# Patient Record
Sex: FEMALE | Race: BLACK OR AFRICAN AMERICAN | NOT HISPANIC OR LATINO | Employment: UNEMPLOYED
[De-identification: names, ages, dates, MRNs, and addresses within clinical notes are randomized per-mention and may not be internally consistent; named-entity substitution may affect disease eponyms.]

---

## 2017-08-26 ENCOUNTER — HEALTH MAINTENANCE LETTER (OUTPATIENT)
Age: 22
End: 2017-08-26

## 2018-08-01 ENCOUNTER — OFFICE VISIT (OUTPATIENT)
Dept: FAMILY MEDICINE | Facility: CLINIC | Age: 23
End: 2018-08-01
Payer: COMMERCIAL

## 2018-08-01 VITALS
HEART RATE: 76 BPM | BODY MASS INDEX: 27.99 KG/M2 | DIASTOLIC BLOOD PRESSURE: 80 MMHG | WEIGHT: 173.4 LBS | TEMPERATURE: 98.3 F | OXYGEN SATURATION: 98 % | SYSTOLIC BLOOD PRESSURE: 110 MMHG

## 2018-08-01 DIAGNOSIS — M79.672 BILATERAL FOOT PAIN: Primary | ICD-10-CM

## 2018-08-01 DIAGNOSIS — Z00.00 HEALTHCARE MAINTENANCE: ICD-10-CM

## 2018-08-01 DIAGNOSIS — M79.671 BILATERAL FOOT PAIN: Primary | ICD-10-CM

## 2018-08-01 RX ORDER — CALCIUM CARBONATE 500(1250)
1 TABLET ORAL 2 TIMES DAILY
Qty: 180 TABLET | Refills: 3 | Status: SHIPPED | OUTPATIENT
Start: 2018-08-01

## 2018-08-01 ASSESSMENT — ENCOUNTER SYMPTOMS
CARDIOVASCULAR NEGATIVE: 1
CONSTITUTIONAL NEGATIVE: 1
RESPIRATORY NEGATIVE: 1

## 2018-08-01 NOTE — PATIENT INSTRUCTIONS
Here is the plan from today's visit    1. Healthcare maintenance  - cholecalciferol (VITAMIN D3) 1000 UNIT tablet; Take 1 tablet (1,000 Units) by mouth daily  Dispense: 100 tablet; Refill: 3  - calcium carbonate (OS-DALE 500 MG [place]. [place]) 500 MG tablet; Take 1 tablet (500 mg) by mouth 2 times daily  Dispense: 180 tablet; Refill: 3    2. Bilateral foot pain      Superfeet - green color - insole to support feet, prevent pain.        Thank you for coming to [facility] today.  Lab Testing:  **If you had lab testing today and your results are reassuring or normal they will be mailed to you or sent through [employer] within 7 days.   **If the lab tests need quick action we will call you with the results.  The phone number we will call with results is # [phone] (home) . If this is not the best number please call our clinic and change the number.  Medication Refills:  If you need any refills please call your pharmacy and they will contact us.   If you need to  your refill at a new pharmacy, please contact the new pharmacy directly. The new pharmacy will help you get your medications transferred faster.   Scheduling:  If you have any concerns about today's visit or wish to schedule another appointment please call our office during normal business hours [phone] (8-5:00 M-F)  If a referral was made to a [facility] and you don't get a call from central scheduling please call [phone].  If a Mammogram was ordered for you at [facility] call [phone] to schedule or change your appointment.  If you had an XRay/CT/Ultrasound/MRI ordered the number is [phone] to schedule or change your radiology appointment.   Medical Concerns:  If you have urgent medical concerns please call [phone] at any time of the day.  If you have a medical emergency please call [phone].

## 2018-08-01 NOTE — PROGRESS NOTES
"    Female Physical Note          HPI         Concerns today: {SMI CONCERNS:382374}  {:523373}    Patient Active Problem List   Diagnosis     Health Care Home     Functional Murmur     Dysmenorrhea     Vitamin D deficiency     Myopia of both eyes       Past Medical History:   Diagnosis Date     Still's heart murmur 2007       Previous Medical Care   ***     History reviewed. No pertinent family history.           Review of Systems:     Review of Systems:  {ROS NORMAL:429122::\"CONSTITUTIONAL: NEGATIVE for fever, chills, change in weight\",\"INTEGUMENTARY/SKIN: NEGATIVE for worrisome rashes, moles or lesions\",\"EYES: NEGATIVE for vision changes or irritation\",\"ENT/MOUTH: NEGATIVE for ear, mouth and throat problems\",\"RESP: NEGATIVE for significant cough or SOB\",\"BREAST: NEGATIVE for masses, tenderness or discharge\",\"CV: NEGATIVE for chest pain, palpitations or peripheral edema\",\"GI: NEGATIVE for nausea, abdominal pain, heartburn, or change in bowel habits\",\": NEGATIVE for frequency, dysuria, or hematuria\",\"MUSCULOSKELETAL: NEGATIVE for significant arthralgias or myalgia\",\"NEURO: NEGATIVE for weakness, dizziness or paresthesias\",\"ENDOCRINE: NEGATIVE for temperature intolerance, skin/hair changes\",\"HEME/ALLERGY: NEGATIVE for bleeding problems\",\"PSYCHIATRIC: NEGATIVE for changes in mood or affect\"}  Sleep:   Do you snore most or the night (as reported by a family member)? {NO IS DEFAULT/YES:91190239::\"No\"}  Do you feel sleepy or extremely tired during most of the day? {NO IS DEFAULT/YES:27432561::\"No\"}  {If both questions are answered with \"yes\" consider evaluating the patient for WILLIE with the dot phrase \".smics\" either this visit or at a follow up visit }           Social History     Social History     Social History     Marital status: Single     Spouse name: N/A     Number of children: N/A     Years of education: N/A     Occupational History     Not on file.     Social History Main Topics     Smoking status: " "Never Smoker     Smokeless tobacco: Never Used     Alcohol use No     Drug use: No     Sexual activity: No     Other Topics Concern     Not on file     Social History Narrative       Marital Status:{MARITAL STATUS:985624}  Who lives in your household? ***    Has anyone hurt you physically, for example by pushing, hitting, slapping or kicking you or forcing you to have sex? {[facility] DENIES:338042::\"Denies\"}  Do you feel threatened or controlled by a partner, ex-partner or anyone in your life? {[facility] DENIES:327151::\"Denies\"}    Sexual Health     Sexual concerns: {YES / NO:328200::\"Yes\"}   STI History: {NEG/POS-NEG DEFAULT:471853::\"Neg\"}  Pregnancy History:   LMP No LMP recorded. {[facility] LMP:842875}  Last Pap Smear Date: No results found for: PAP  Abnormal Pap History: {[facility] ABNORMAL PAP:312036}    Recommended Screening     {[facility] USPSTF LEVEL A:344912}  {[facility] USPSTF LEVEL B:016760}  {B-RST BRCA- Risk Tool}         Physical Exam:     Vitals: /80  Pulse 76  Temp 98.3  F (36.8  C) (Oral)  Wt 173 lb 6.4 oz (78.7 kg)  SpO2 98%  BMI 27.99 kg/m2  BMI= Body mass index is 27.99 kg/(m^2).   {EXAM - FEMALE- zebra stripe:063498::\"GENERAL: healthy, alert and no distress\",\"EYES: Eyes grossly normal to inspection, extraocular movements - intact, and PERRL\",\"HENT: ear canals- normal; TMs- normal; Nose- normal; Mouth- no ulcers, no lesions\",\"NECK: no tenderness, no adenopathy, no asymmetry, no masses, no stiffness; thyroid- normal to palpation\",\"RESP: lungs clear to auscultation - no rales, no rhonchi, no wheezes\",\"BREAST: no masses, no tenderness, no nipple discharge, no palpable axillary masses or adenopathy\",\"CV: regular rates and rhythm, normal S1 S2, no S3 or S4 and no murmur, no click or rub -\",\"ABDOMEN: soft, no tenderness, no  hepatosplenomegaly, no masses, normal bowel sounds\",\"MS: extremities- no gross deformities noted, no edema\",\"SKIN: no suspicious lesions, no rashes\",\"NEURO: strength and tone- normal, sensory exam- " "grossly normal, mentation- intact, speech- normal, reflexes- symmetric\",\"BACK: no CVA tenderness, no paralumbar tenderness\",\"- female: cervix- normal, adnexae- normal; uterus- normal, no masses, no discharge\",\"RECTAL- female: no masses, no hemorrhoids\",\"PSYCH: Alert and oriented times 3; speech- coherent , normal rate and volume; able to articulate logical thoughts, able to abstract reason, no tangential thoughts, no hallucinations or delusions, affect- normal\",\"LYMPHATICS: ant. cervical- normal, post. cervical- normal, axillary- normal, supraclavicular- normal, inguinal- normal\"}      Assessment and Plan      Danica was seen today for physical and refill request.    Diagnoses and all orders for this visit:    Routine general medical examination at a health care facility      {[facility] FEMALE ASSESSMENT:870116::\"1. Health Care Maintenance: Normal Physical Exam\"}      1. {[facility] FEMALE PLAN 1:960700}  2.Contraception: {[facility] CONTRACEPTION:720654}    Options for treatment and follow-up care were reviewed with the patient . Danica Ruiz and/or guardian engaged in the decision making process and verbalized understanding of the options discussed and agreed with the final plan.    Sharon Pham, APRN CNP  "

## 2018-08-01 NOTE — PROGRESS NOTES
AMBROSIO Ruiz is a 23 year old  who presents for   Chief Complaint   Patient presents with     Musculoskeletal Problem     foot pain both but left is worse. Would like a bone strengthening vitamin - no pain currently     Refill Request     Vitamin D     1.  History of bilateral foot pain, worse in left foot.  No pain currently.  Notices this with standing for many hours, has a throbbing pain.   Wears open toed shoes or tennis shoes or high heels.     2.  Would like refills of Vitamin D and calcium supplement.      No other concerns at today's visit.           Problem, Medication and Allergy Lists were   reviewed and updated if needed.     Patient Active Problem List    Diagnosis Date Noted     Myopia of both eyes 06/29/2016     Priority: Medium     Vitamin D deficiency 10/25/2012     Priority: Medium     Health Care Home 10/19/2012     Priority: Medium     Tier 1   DX V65.8 REPLACED WITH 98067 HEALTH CARE HOME (04/08/2013)       Functional Murmur 10/19/2012     Priority: Medium     Seen by [facility] in 2007 (Dr. Cheng):  Danica narayanan has an innocent murmur, mainly a vibratory or Still's murmur.  There will be no need for further pediatric cardiology evaluation or followup is recommended.  There is no need for activity restriction.  There is no need for activity restriction.  There is no need for SBE prophylaxis.          Dysmenorrhea 10/19/2012     Priority: Medium   ,     Current Outpatient Prescriptions   Medication Sig Dispense Refill     Acetaminophen (TYLENOL PO) Take 325 mg by mouth every 6 hours as needed for mild pain or fever       calcium carbonate (OS-DALE 500 MG [place]. [place]) 500 MG tablet Take 1 tablet (500 mg) by mouth 2 times daily 180 tablet 3     cholecalciferol (VITAMIN D3) 1000 UNIT tablet Take 1 tablet (1,000 Units) by mouth daily 100 tablet 3     EPINEPHrine (EPIPEN) 0.3 MG/0.3ML injection Inject 0.3 mLs (0.3 mg) into the muscle once as needed for anaphylaxis  (Patient not taking: Reported on 8/1/2018) 1 each 0   ,   No Known Allergies.    Patient is an established patient of this clinic..         Review of Systems:   Review of Systems   Constitutional: Negative.    Respiratory: Negative.    Cardiovascular: Negative.    Musculoskeletal:        Intermittent foot pain, now resolved              Physical Exam:     Vitals:    08/01/18 1345   BP: 110/80   Pulse: 76   Temp: 98.3  F (36.8  C)   TempSrc: Oral   SpO2: 98%   Weight: 173 lb 6.4 oz (78.7 kg)     Body mass index is 27.99 kg/(m^2).  Vitals were reviewed and were normal     Physical Exam   Constitutional: She is oriented to person, place, and time. She appears well-nourished. No distress.   Musculoskeletal:   Bilateral feet with normal ROM, no tenderness with palpation, no visible swelling  Normal cap refill and pulses   Neurological: She is alert and oriented to person, place, and time.   Psychiatric: She has a normal mood and affect.           Assessment and Plan        Danica was seen today for physical and refill request.    Diagnoses and all orders for this visit:    Bilateral foot pain, now resolved  Counseled patient regarding adequate shoe wear, trying insoles for support.     Follow-up in clinic with any return of foot pain.     Healthcare maintenance  -     cholecalciferol (VITAMIN D3) 1000 UNIT tablet; Take 1 tablet (1,000 Units) by mouth daily  -     calcium carbonate (OS-DALE 500 MG [place]. [place]) 500 MG tablet; Take 1 tablet (500 mg) by mouth 2 times daily  Tdap and HPV #1 administered today.       Options for treatment and follow-up care were reviewed with the patient. Danica Ruiz  engaged in the decision making process and verbalized understanding of the options discussed and agreed with the final plan.    Sharon Pham, MARY CNP

## 2018-08-01 NOTE — MR AVS SNAPSHOT
After Visit Summary   8/1/2018    Danica Ruiz    MRN: [id number]           Patient Information     Date Of Birth          1995        Visit Information        Provider Department      8/1/2018 1:40 PM Sharon Pham APRN CNP [facility]        Today's Diagnoses     Healthcare maintenance    -  1    Bilateral foot pain          Care Instructions    Here is the plan from today's visit    1. Healthcare maintenance  - cholecalciferol (VITAMIN D3) 1000 UNIT tablet; Take 1 tablet (1,000 Units) by mouth daily  Dispense: 100 tablet; Refill: 3  - calcium carbonate (OS-DALE 500 MG [place]. [place]) 500 MG tablet; Take 1 tablet (500 mg) by mouth 2 times daily  Dispense: 180 tablet; Refill: 3    2. Bilateral foot pain      Superfeet - green color - insole to support feet, prevent pain.        Thank you for coming to [facility] today.  Lab Testing:  **If you had lab testing today and your results are reassuring or normal they will be mailed to you or sent through [employer] within 7 days.   **If the lab tests need quick action we will call you with the results.  The phone number we will call with results is # [phone] (home) . If this is not the best number please call our clinic and change the number.  Medication Refills:  If you need any refills please call your pharmacy and they will contact us.   If you need to  your refill at a new pharmacy, please contact the new pharmacy directly. The new pharmacy will help you get your medications transferred faster.   Scheduling:  If you have any concerns about today's visit or wish to schedule another appointment please call our office during normal business hours [phone] (8-5:00 M-F)  If a referral was made to a [facility] and you don't get a call from central scheduling please call [phone].  If a Mammogram was ordered for you at [facility] call [phone] to schedule or change your  appointment.  If you had an XRay/CT/Ultrasound/MRI ordered the number is [phone] to schedule or change your radiology appointment.   Medical Concerns:  If you have urgent medical concerns please call [phone] at any time of the day.  If you have a medical emergency please call 911.            Follow-ups after your visit        Who to contact     Please call your clinic at [phone] to:    Ask questions about your health    Make or cancel appointments    Discuss your medicines    Learn about your test results    Speak to your doctor            Additional Information About Your Visit        [email or web address] Information     [employer] is an electronic gateway that provides easy, online access to your medical records. With [employer], you can request a clinic appointment, read your test results, renew a prescription or communicate with your care team.     To sign up for [email or web address] visit the website at [email or web address]   You will be asked to enter the access code listed below, as well as some personal information. Please follow the directions to create your username and password.     Your access code is: [email or web address]  Expires: 10/30/2018  2:03 PM     Your access code will  in 90 days. If you need help or a new code, please contact your [facility] or call [phone] for assistance.        Care EveryWhere ID     This is your Care EveryWhere ID. This could be used by other organizations to access your [place] medical records  [id number]        Your Vitals Were     Pulse Temperature Pulse Oximetry BMI (Body Mass Index)          76 98.3  F (36.8  C) (Oral) 98% 27.99 kg/m2         Blood Pressure from Last 3 Encounters:   18 110/80   16 120/78   16 111/73    Weight from Last 3 Encounters:   18 173 lb 6.4 oz (78.7 kg)   16 195 lb (88.5 kg)   16 200 lb 9.6 oz (91 kg)              Today, you had the following     No orders found for display          Today's Medication Changes          These changes are accurate as of 8/1/18  2:03 PM.  If you have any questions, ask your nurse or doctor.               Start taking these medicines.        Dose/Directions    calcium carbonate 500 MG tablet   Commonly known as:  OS-DALE 500 mg [place]. [place]   Used for:  Healthcare maintenance   Started by:  Sharon Pham APRN CNP        Dose:  1 tablet   Take 1 tablet (500 mg) by mouth 2 times daily   Quantity:  180 tablet   Refills:  3       cholecalciferol 1000 UNIT tablet   Commonly known as:  vitamin D3   Used for:  Healthcare maintenance   Started by:  Sharon Pham APRN CNP        Dose:  1000 Units   Take 1 tablet (1,000 Units) by mouth daily   Quantity:  100 tablet   Refills:  3         Stop taking these medicines if you haven't already. Please contact your care team if you have questions.     diphenhydrAMINE 25 MG tablet   Commonly known as:  BENADRYL   Stopped by:  Sharon Pham APRN CNP           vitamin D 12184 UNIT capsule   Commonly known as:  ERGOCALCIFEROL   Stopped by:  Sharon Pham APRN CNP                Where to get your medicines      These medications were sent to [facility]/pharmacy #[id number] [address]     Phone:  [phone]     calcium carbonate 500 MG tablet    cholecalciferol 1000 UNIT tablet                Primary Care Provider Office Phone # Fax #    Elizabeth Nevarez [phone]       [address] [address]        Equal Access to Services     [facility]: Frida Maxwell, waaxda lurebel, qaybta kaalmalee richardson. So [facility] [phone].    ATENCIÓN: Si habla español, tiene a gill disposición servicios gratuitos de asistencia lingüística. Llame al [phone].    We comply with applicable federal civil rights laws and [place] laws. We do not discriminate on the basis of  race, color, national origin, age, disability, sex, sexual orientation, or gender identity.            Thank you!     Thank you for choosing [facility]  for your care. Our goal is always to provide you with excellent care. Hearing back from our patients is one way we can continue to improve our services. Please take a few minutes to complete the written survey that you may receive in the mail after your visit with us. Thank you!             Your Updated Medication List - Protect others around you: Learn how to safely use, store and throw away your medicines at www.disposemymeds.org.          This list is accurate as of 8/1/18  2:03 PM.  Always use your most recent med list.                   Brand Name Dispense Instructions for use Diagnosis    calcium carbonate 500 MG tablet    OS-DALE 500 mg [place]. [place]    180 tablet    Take 1 tablet (500 mg) by mouth 2 times daily    Healthcare maintenance       cholecalciferol 1000 UNIT tablet    vitamin D3    100 tablet    Take 1 tablet (1,000 Units) by mouth daily    Healthcare maintenance       EPINEPHrine 0.3 MG/0.3ML injection 2-pack    EPIPEN/ADRENACLICK/or ANY BX GENERIC EQUIV    1 each    Inject 0.3 mLs (0.3 mg) into the muscle once as needed for anaphylaxis        TYLENOL PO      Take 325 mg by mouth every 6 hours as needed for mild pain or fever

## 2018-08-10 ENCOUNTER — OFFICE VISIT (OUTPATIENT)
Dept: OPHTHALMOLOGY | Facility: CLINIC | Age: 23
End: 2018-08-10
Payer: COMMERCIAL

## 2018-08-10 DIAGNOSIS — H52.13 MYOPIA OF BOTH EYES: Primary | ICD-10-CM

## 2018-08-10 ASSESSMENT — TONOMETRY
IOP_METHOD: ICARE
OS_IOP_MMHG: 19
OD_IOP_MMHG: 19

## 2018-08-10 ASSESSMENT — REFRACTION_WEARINGRX
OD_AXIS: 090
OS_AXIS: 090
OD_SPHERE: -2.25
OS_CYLINDER: +0.50
OD_CYLINDER: +0.25
OS_SPHERE: -2.25

## 2018-08-10 ASSESSMENT — EXTERNAL EXAM - LEFT EYE: OS_EXAM: NORMAL

## 2018-08-10 ASSESSMENT — VISUAL ACUITY
OS_SC+: +2
METHOD: SNELLEN - LINEAR
OD_SC: 20/100
OS_SC: 20/100

## 2018-08-10 ASSESSMENT — CUP TO DISC RATIO
OD_RATIO: 0.2
OS_RATIO: 0.2

## 2018-08-10 ASSESSMENT — CONF VISUAL FIELD
METHOD: COUNTING FINGERS
OD_NORMAL: 1
OS_NORMAL: 1

## 2018-08-10 ASSESSMENT — SLIT LAMP EXAM - LIDS
COMMENTS: NORMAL
COMMENTS: NORMAL

## 2018-08-10 ASSESSMENT — REFRACTION_MANIFEST
OS_SPHERE: -2.50
OD_SPHERE: -2.50
OS_CYLINDER: SPHERE
OD_CYLINDER: SPHERE

## 2018-08-10 ASSESSMENT — EXTERNAL EXAM - RIGHT EYE: OD_EXAM: NORMAL

## 2018-08-10 NOTE — PROGRESS NOTES
Assessment/Plan  (H52.13) Myopia of both eyes  (primary encounter diagnosis)  Comment: Myopia OU  Plan: REFRACTION [32002]         Educated patient on condition and clinical findings. Dispensed spectacle prescription for full time wear. Educated patient on possibility of adaptation period, if symptoms do not improve return to clinic for further testing.   Explained that watering after extended reading, TV, or bright light is normal. Recommended rest and artificial tears. Return if symptoms do not improve.    Return to clinic in 1 year for comprehensive eye exam.    Complete documentation of historical and exam elements from today's encounter can  be found in the full encounter summary report (not reduplicated in this progress  note). I personally obtained the chief complaint(s) and history of present illness. I  confirmed and edited as necessary the review of systems, past medical/surgical  history, family history, social history, and examination findings as documented by  others; and I examined the patient myself. I personally reviewed the relevant tests,  images, and reports as documented above. I formulated and edited as necessary the  assessment and plan and discussed the findings and management plan with the  patient and family.    Yohan Wright, OD, FAAO

## 2018-08-10 NOTE — NURSING NOTE
Chief Complaints and History of Present Illnesses   Patient presents with     COMPREHENSIVE EYE EXAM      HPI    Affected eye(s):  Both   Symptoms:     No decreased vision            Comments:  New patient here for complete eye exam.  Glasses are broken and prescription is older.  Needs new prescription.  Eyes get irritated and watery sometimes.  No redness, itch, or matter.  Eye meds:  None  CYNTHIA Reich 8/10/2018 12:28 PM

## 2018-08-10 NOTE — MR AVS SNAPSHOT
After Visit Summary   8/10/2018    Danica Ruiz    MRN: [id number]           Patient Information     Date Of Birth          1995        Visit Information        Provider Department      8/10/2018 12:40 PM Yohan Wright, [facility]        Today's Diagnoses     Myopia of both eyes    -  1       Follow-ups after your visit        Follow-up notes from your care team     Return in about 1 year (around 8/10/2019) for Comprehensive Eye Exam.      Who to contact     Please call your clinic at [phone] to:    Ask questions about your health    Make or cancel appointments    Discuss your medicines    Learn about your test results    Speak to your doctor            Additional Information About Your Visit        MyChart Information     [employer] is an electronic gateway that provides easy, online access to your medical records. With [employer], you can request a clinic appointment, read your test results, renew a prescription or communicate with your care team.     To sign up for [employer] visit the website at [email or web address]   You will be asked to enter the access code listed below, as well as some personal information. Please follow the directions to create your username and password.     Your access code is: [email or web address]  Expires: 10/30/2018  2:03 PM     Your access code will  in 90 days. If you need help or a new code, please contact your [facility] or call [phone] for assistance.        Care EveryWhere ID     This is your Care EveryWhere ID. This could be used by other organizations to access your [place] medical records  [id number]         Blood Pressure from Last 3 Encounters:   18 110/80   16 120/78   16 111/73    Weight from Last 3 Encounters:   18 78.7 kg (173 lb 6.4 oz)   16 88.5 kg (195 lb)   16 91 kg (200 lb 9.6 oz)              We Performed the Following     REFRACTION [74733]         Primary Care Provider Office Phone # Fax #    Elizabeth Nevarez [phone]       [address] [address] [facility] [place] [address]        Equal Access to Services     CANDACEMARTÍN STEFAN : Hadii aad ku hadleonalucius Maxwell, vin heathheatherha, jerome hallman, lee quinteros kevinfranchesca byrd laOmarzach [place]. So [facility] [phone].    ATENCIÓN: Si habla español, tiene a gill disposición servicios gratuitos de asistencia lingüística. Llame al [phone].    We comply with applicable federal civil rights laws and [place] laws. We do not discriminate on the basis of race, color, national origin, age, disability, sex, sexual orientation, or gender identity.            Thank you!     Thank you for choosing [facility]  for your care. Our goal is always to provide you with excellent care. Hearing back from our patients is one way we can continue to improve our services. Please take a few minutes to complete the written survey that you may receive in the mail after your visit with us. Thank you!             Your Updated Medication List - Protect others around you: Learn how to safely use, store and throw away your medicines at www.disposemymeds.org.          This list is accurate as of 8/10/18  1:55 PM.  Always use your most recent med list.                   Brand Name Dispense Instructions for use Diagnosis    calcium carbonate 500 MG tablet    OS-DALE 500 mg [place]. [place]    180 tablet    Take 1 tablet (500 mg) by mouth 2 times daily    Healthcare maintenance       cholecalciferol 1000 UNIT tablet    vitamin D3    100 tablet    Take 1 tablet (1,000 Units) by mouth daily    Healthcare maintenance       EPINEPHrine 0.3 MG/0.3ML injection 2-pack    EPIPEN/ADRENACLICK/or ANY BX GENERIC EQUIV    1 each    Inject 0.3 mLs (0.3 mg) into the muscle once as needed for anaphylaxis        TYLENOL PO      Take 325 mg by mouth every 6 hours as needed for mild pain or fever

## 2023-06-06 ENCOUNTER — OFFICE VISIT (OUTPATIENT)
Dept: OPTOMETRY | Facility: CLINIC | Age: 28
End: 2023-06-06
Payer: COMMERCIAL

## 2023-06-06 DIAGNOSIS — Z01.00 ENCOUNTER FOR EXAMINATION OF EYES AND VISION WITHOUT ABNORMAL FINDINGS: Primary | ICD-10-CM

## 2023-06-06 DIAGNOSIS — H52.13 MYOPIA OF BOTH EYES: ICD-10-CM

## 2023-06-06 PROCEDURE — 92004 COMPRE OPH EXAM NEW PT 1/>: CPT | Performed by: OPTOMETRIST

## 2023-06-06 PROCEDURE — 92015 DETERMINE REFRACTIVE STATE: CPT | Performed by: OPTOMETRIST

## 2023-06-06 ASSESSMENT — CONF VISUAL FIELD
OD_INFERIOR_NASAL_RESTRICTION: 0
OS_SUPERIOR_TEMPORAL_RESTRICTION: 0
OD_SUPERIOR_NASAL_RESTRICTION: 0
OS_INFERIOR_NASAL_RESTRICTION: 0
METHOD: COUNTING FINGERS
OD_SUPERIOR_TEMPORAL_RESTRICTION: 0
OD_NORMAL: 1
OS_SUPERIOR_NASAL_RESTRICTION: 0
OS_NORMAL: 1
OD_INFERIOR_TEMPORAL_RESTRICTION: 0
OS_INFERIOR_TEMPORAL_RESTRICTION: 0

## 2023-06-06 ASSESSMENT — CUP TO DISC RATIO
OS_RATIO: 0.25
OD_RATIO: 0.2

## 2023-06-06 ASSESSMENT — REFRACTION_MANIFEST
OD_SPHERE: -3.00
OS_SPHERE: -3.00
OS_CYLINDER: SPHERE
OD_CYLINDER: SPHERE

## 2023-06-06 ASSESSMENT — VISUAL ACUITY
OS_CC+: -2
OS_SC: 20/300
OD_SC: 20/30+2
OS_SC: 20/30-1
OD_CC+: -1
OS_CC: 20/20
OD_SC: 20/300
OD_CC: 20/20
METHOD: SNELLEN - LINEAR
OD_CC: 20/20-1
OS_CC: 20/20-1
CORRECTION_TYPE: GLASSES

## 2023-06-06 ASSESSMENT — REFRACTION_WEARINGRX
OS_CYLINDER: SPHERE
OD_SPHERE: -2.50
OS_SPHERE: -2.50
SPECS_TYPE: SVL
OD_CYLINDER: SPHERE

## 2023-06-06 ASSESSMENT — TONOMETRY
OD_IOP_MMHG: 18
IOP_METHOD: APPLANATION
OS_IOP_MMHG: 18

## 2023-06-06 ASSESSMENT — SLIT LAMP EXAM - LIDS
COMMENTS: NORMAL
COMMENTS: NORMAL

## 2023-06-06 ASSESSMENT — EXTERNAL EXAM - RIGHT EYE: OD_EXAM: NORMAL

## 2023-06-06 ASSESSMENT — EXTERNAL EXAM - LEFT EYE: OS_EXAM: NORMAL

## 2023-06-06 NOTE — PATIENT INSTRUCTIONS
"Updated glasses prescription provided today.     Often times excessive tearing is a response to dryness, so I recommend using artificial tears to help prevent your eyes from becoming dry. There are over the counter drops that work well and may be used up to 4x daily (Systane , Refresh, Thera Tears)- avoid \"get the red out\" drops.     Return next Thursday 6/15 at 9:45am for contact lens training.     The effects of the dilating drops last for 4- 6 hours.  You will be more sensitive to light and vision will be blurry up close.  Mydriatic sunglasses were given if needed.     Aniceto Delgado, OD  [facility] [place]  [address] [place] [address]. [place]  [id number] [phone]   "

## 2023-06-06 NOTE — LETTER
"    6/6/2023         RE: Danica Ruiz  [address]  [place] [address]        Dear Colleague,    Thank you for referring your patient, Danica Ruiz, to the [facility]. Please see a copy of my visit note below.    Chief Complaint   Patient presents with     Annual Eye Exam     New Eval For Contact Lens        Previous contact lens wearer? No - would like to discuss wearing CL's full time     -Discussed $100 fitting fee and need for I&R       Last Eye Exam: 8/10/2018  Dilated Previously: Yes, side effects of dilation explained today    What are you currently using to see?  Glasses - SVL - wears full time     Distance Vision Acuity: Satisfied with vision    Near Vision Acuity: Satisfied with vision while reading and using computer with glasses    Eye Comfort: watery and burning - maybe allergies   Do you use eye drops? : No - would like some recommendations   Occupation or Hobbies:       [employer]     Medical, surgical and family histories reviewed and updated 6/6/2023.       OBJECTIVE: See Ophthalmology exam    ASSESSMENT:    ICD-10-CM    1. Encounter for examination of eyes and vision without abnormal findings  Z01.00 EYE EXAM (SIMPLE-NONBILLABLE)      2. Myopia of both eyes  H52.13 EYE EXAM (SIMPLE-NONBILLABLE)     REFRACTION         PLAN:     Patient Instructions   Updated glasses prescription provided today.     Often times excessive tearing is a response to dryness, so I recommend using artificial tears to help prevent your eyes from becoming dry. There are over the counter drops that work well and may be used up to 4x daily (Systane , Refresh, Thera Tears)- avoid \"get the red out\" drops.     Return next Thursday 6/15 at 9:45am for contact lens training.     The effects of the dilating drops last for 4- 6 hours.  You will be more sensitive to light and vision will be blurry up close.  Mydriatic sunglasses were given if needed.     Aniceto Delgado, " KAMALA  [facility]  [address] [place]  [address]    [phone]                    Again, thank you for allowing me to participate in the care of your patient.        Sincerely,        Aniceto Delgado OD

## 2023-06-06 NOTE — PROGRESS NOTES
"Chief Complaint   Patient presents with     Annual Eye Exam     New Eval For Contact Lens        Previous contact lens wearer? No - would like to discuss wearing CL's full time     -Discussed $100 fitting fee and need for I&R       Last Eye Exam: 8/10/2018  Dilated Previously: Yes, side effects of dilation explained today    What are you currently using to see?  Glasses - SVL - wears full time     Distance Vision Acuity: Satisfied with vision    Near Vision Acuity: Satisfied with vision while reading and using computer with glasses    Eye Comfort: watery and burning - maybe allergies   Do you use eye drops? : No - would like some recommendations   Occupation or Hobbies:       [employer]     Medical, surgical and family histories reviewed and updated 6/6/2023.       OBJECTIVE: See Ophthalmology exam    ASSESSMENT:    ICD-10-CM    1. Encounter for examination of eyes and vision without abnormal findings  Z01.00 EYE EXAM (SIMPLE-NONBILLABLE)      2. Myopia of both eyes  H52.13 EYE EXAM (SIMPLE-NONBILLABLE)     REFRACTION         PLAN:     Patient Instructions   Updated glasses prescription provided today.     Often times excessive tearing is a response to dryness, so I recommend using artificial tears to help prevent your eyes from becoming dry. There are over the counter drops that work well and may be used up to 4x daily (Systane , Refresh, Thera Tears)- avoid \"get the red out\" drops.     Return next Thursday 6/15 at 9:45am for contact lens training.     The effects of the dilating drops last for 4- 6 hours.  You will be more sensitive to light and vision will be blurry up close.  Mydriatic sunglasses were given if needed.     Aniceto Delgado, OD  [address] [place]  [address]    [phone]                "

## 2023-06-15 ENCOUNTER — OFFICE VISIT (OUTPATIENT)
Dept: OPTOMETRY | Facility: CLINIC | Age: 28
End: 2023-06-15
Payer: COMMERCIAL

## 2023-06-15 DIAGNOSIS — H52.13 MYOPIA OF BOTH EYES: ICD-10-CM

## 2023-06-15 DIAGNOSIS — Z46.0 CONTACT LENS/GLASSES FITTING: Primary | ICD-10-CM

## 2023-06-15 PROCEDURE — 99207 PR NO CHARGE LOS: CPT | Performed by: OPTOMETRIST

## 2023-06-15 PROCEDURE — 92310 CONTACT LENS FITTING OU: CPT | Mod: GA | Performed by: OPTOMETRIST

## 2023-06-15 ASSESSMENT — REFRACTION_CURRENTRX
OS_BRAND: ALCON AIR OPTIX PLUS HYDRAGLYDE BC 8.6, D 14.2
OS_SPHERE: -3.00
OD_CYLINDER: SPHERE
OS_CYLINDER: SPHERE
OD_SPHERE: -3.00
OD_BRAND: ALCON AIR OPTIX PLUS HYDRAGLYDE BC 8.6, D 14.2

## 2023-06-15 ASSESSMENT — SLIT LAMP EXAM - LIDS
COMMENTS: NORMAL
COMMENTS: NORMAL

## 2023-06-15 ASSESSMENT — VISUAL ACUITY
OD_CC: 20/20
OS_CC: 20/20
METHOD: SNELLEN - LINEAR
OS_CC: 20/20
CORRECTION_TYPE: CONTACTS
OD_CC: 20/20

## 2023-06-15 ASSESSMENT — EXTERNAL EXAM - LEFT EYE: OS_EXAM: NORMAL

## 2023-06-15 ASSESSMENT — EXTERNAL EXAM - RIGHT EYE: OD_EXAM: NORMAL

## 2023-06-15 NOTE — PATIENT INSTRUCTIONS
Begin trial of Air Optix contact lenses.   Maximum wear-time of 12 hours/day of the contact lenses.   Clean the lenses nightly in contact lens solution.   No sleeping or swimming in the contact lenses.     If adequate comfort/vision with contact lenses we can finalize the prescription. If not, notify me and we can consider other options.       Aniceto Delgado OD  [address]  [place]  [address]    [phone]

## 2023-06-15 NOTE — PROGRESS NOTES
Chief Complaint   Patient presents with     New Eval For Contact Lens     I&R successful. $100 fitting fee collected.     Contact lenses dispensed.         Agata Ashby       OBJECTIVE: See Ophthalmology exam     ASSESSMENT:    ICD-10-CM    1. Contact lens/glasses fitting  Z46.0       2. Myopia of both eyes  H52.13          PLAN:  Patient Instructions   Begin trial of Air Optix contact lenses.   Maximum wear-time of 12 hours/day of the contact lenses.   Clean the lenses nightly in contact lens solution.   No sleeping or swimming in the contact lenses.     If adequate comfort/vision with contact lenses we can finalize the prescription. If not, notify me and we can consider other options.       Aniceto Delgado, OD  [facility] [place]  [address] [place] [address]. [place]  [phone]

## 2023-06-15 NOTE — LETTER
6/15/2023         RE: Danica Ruiz  [address]  [place] [address]        Dear Colleague,    Thank you for referring your patient, Danica Ruiz, to the [facility]. Please see a copy of my visit note below.    Chief Complaint   Patient presents with     New Eval For Contact Lens     I&R successful. $100 fitting fee collected.     Contact lenses dispensed.         Agata Ashby       OBJECTIVE: See Ophthalmology exam     ASSESSMENT:    ICD-10-CM    1. Contact lens/glasses fitting  Z46.0       2. Myopia of both eyes  H52.13          PLAN:  Patient Instructions   Begin trial of Air Optix contact lenses.   Maximum wear-time of 12 hours/day of the contact lenses.   Clean the lenses nightly in contact lens solution.   No sleeping or swimming in the contact lenses.     If adequate comfort/vision with contact lenses we can finalize the prescription. If not, notify me and we can consider other options.       Aniceto Delgado OD  [facility]  [address]. [place]  [id number] [phone]               Again, thank you for allowing me to participate in the care of your patient.        Sincerely,        Aniceto Delgado OD